# Patient Record
Sex: FEMALE | URBAN - METROPOLITAN AREA
[De-identification: names, ages, dates, MRNs, and addresses within clinical notes are randomized per-mention and may not be internally consistent; named-entity substitution may affect disease eponyms.]

---

## 2022-01-31 ENCOUNTER — TELEPHONE (OUTPATIENT)
Dept: GASTROENTEROLOGY | Facility: CLINIC | Age: 42
End: 2022-01-31

## 2022-01-31 NOTE — TELEPHONE ENCOUNTER
Pt called asking if there is anyway she can be seen this week? She is experiencing really bad rectal pain that she describes as going up to her waist line until about 12/1 pm. Said she is having 3-4 bowel movements every morning but feels like something is preventing her from emptying completely. Dr. Foy thinks she needs a CT scan and another colon. Has a hx of anal fissure.

## 2022-02-01 DIAGNOSIS — K60.2 ANAL FISSURE: ICD-10-CM

## 2022-02-01 DIAGNOSIS — K62.89 ANAL PAIN: Primary | ICD-10-CM

## 2022-02-01 RX ORDER — ERGOCALCIFEROL 1.25 MG/1
50000 CAPSULE ORAL
COMMUNITY

## 2022-02-01 RX ORDER — SOD SULF/POT CHLORIDE/MAG SULF 1.479 G
12 TABLET ORAL DAILY
Qty: 24 TABLET | Refills: 0 | Status: CANCELLED | OUTPATIENT
Start: 2022-02-01

## 2022-02-01 NOTE — TELEPHONE ENCOUNTER
----- Message from Chelsi Howell sent at 2/1/2022 10:35 AM CST -----  Mahesh Castelan is returning a missed call from the nurse, please give her another call back at [phone] (home)

## 2022-02-01 NOTE — TELEPHONE ENCOUNTER
I spoke with the patient who is fine with going ahead & scheduling colon. She already has ointment and will use this until colonoscopy.  Krysta will schedule.  KDL, CMA

## 2022-02-01 NOTE — TELEPHONE ENCOUNTER
Okay to schedule her a repeat colonoscopy. Does she still have the anal fissure ointment to try in the meantime? If the colonoscopy is unrevealing then plan on CTE. Has she had and blood results in the past 3-6 months she can get us a copy or get CBC/CMP.  NBP